# Patient Record
Sex: FEMALE | Race: WHITE | NOT HISPANIC OR LATINO | Employment: STUDENT | URBAN - METROPOLITAN AREA
[De-identification: names, ages, dates, MRNs, and addresses within clinical notes are randomized per-mention and may not be internally consistent; named-entity substitution may affect disease eponyms.]

---
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
## 2025-06-01 ENCOUNTER — OFFICE VISIT (OUTPATIENT)
Dept: URGENT CARE | Age: 20
End: 2025-06-01
Payer: COMMERCIAL

## 2025-06-01 VITALS
WEIGHT: 279 LBS | HEIGHT: 61 IN | TEMPERATURE: 98.4 F | DIASTOLIC BLOOD PRESSURE: 82 MMHG | HEART RATE: 88 BPM | OXYGEN SATURATION: 96 % | BODY MASS INDEX: 52.67 KG/M2 | RESPIRATION RATE: 18 BRPM | SYSTOLIC BLOOD PRESSURE: 143 MMHG

## 2025-06-01 DIAGNOSIS — H92.01 RIGHT EAR PAIN: ICD-10-CM

## 2025-06-01 DIAGNOSIS — H66.001 NON-RECURRENT ACUTE SUPPURATIVE OTITIS MEDIA OF RIGHT EAR WITHOUT SPONTANEOUS RUPTURE OF TYMPANIC MEMBRANE: Primary | ICD-10-CM

## 2025-06-01 PROCEDURE — 99213 OFFICE O/P EST LOW 20 MIN: CPT | Performed by: PHYSICIAN ASSISTANT

## 2025-06-01 PROCEDURE — 3008F BODY MASS INDEX DOCD: CPT | Performed by: PHYSICIAN ASSISTANT

## 2025-06-01 RX ORDER — AMOXICILLIN 400 MG/5ML
500 POWDER, FOR SUSPENSION ORAL EVERY 12 HOURS SCHEDULED
Qty: 126 ML | Refills: 0 | Status: SHIPPED | OUTPATIENT
Start: 2025-06-01 | End: 2025-06-11

## 2025-06-01 RX ORDER — AMOXICILLIN 400 MG/5ML
500 POWDER, FOR SUSPENSION ORAL EVERY 12 HOURS SCHEDULED
Qty: 126 ML | Refills: 0 | Status: SHIPPED | OUTPATIENT
Start: 2025-06-01 | End: 2025-06-01

## 2025-06-01 ASSESSMENT — ENCOUNTER SYMPTOMS
ABDOMINAL PAIN: 0
NUMBNESS: 0
DIZZINESS: 0
SHORTNESS OF BREATH: 0

## 2025-06-01 NOTE — PROGRESS NOTES
"Subjective   Patient ID: Bertha Donald is a 20 y.o. female. They present today with a chief complaint of Earache (2 days ). Hx of ear infections. Pain in R ear radiating down R face. No discharge. No f/c.    History of Present Illness  HPI    Past Medical History  Allergies as of 06/01/2025    (No Known Allergies)       Prescriptions Prior to Admission[1]     Medical History[2]    Surgical History[3]     reports that she does not drink alcohol and does not use drugs.    Review of Systems  Review of Systems   HENT:          R ear pain   Respiratory:  Negative for shortness of breath.    Cardiovascular:  Negative for chest pain.   Gastrointestinal:  Negative for abdominal pain.   Neurological:  Negative for dizziness and numbness.                                  Objective    Vitals:    06/01/25 1651   BP: 143/82   Pulse: 88   Resp: 18   Temp: 36.9 °C (98.4 °F)   TempSrc: Oral   SpO2: 96%   Weight: 127 kg (279 lb)   Height: 1.549 m (5' 1\")     No LMP recorded.    Physical Exam  Vitals and nursing note reviewed.   Constitutional:       Appearance: Normal appearance.   HENT:      Right Ear: No middle ear effusion. Tympanic membrane is injected, erythematous and bulging.      Left Ear:  No middle ear effusion. Tympanic membrane is not injected or erythematous.   Eyes:      Conjunctiva/sclera: Conjunctivae normal.   Cardiovascular:      Rate and Rhythm: Normal rate.   Pulmonary:      Effort: Pulmonary effort is normal.   Neurological:      Mental Status: She is alert.   Psychiatric:         Mood and Affect: Mood normal.         Procedures    Point of Care Test & Imaging Results from this visit  No results found for this visit on 06/01/25.   Imaging  No results found.    Cardiology, Vascular, and Other Imaging  No other imaging results found for the past 2 days      Diagnostic study results (if any) were reviewed by Gianni Willson PA-C.    Assessment/Plan   Allergies, medications, history, and pertinent labs/EKGs/Imaging " reviewed by Gianni Willson PA-C.     Medical Decision Making  Findings consistent with AOM without rupture. No evidence of mastoiditis, PTA, PNA on exam. Education on supportive measures and return precautions.   amox    Orders and Diagnoses  Diagnoses and all orders for this visit:  Non-recurrent acute suppurative otitis media of right ear without spontaneous rupture of tympanic membrane  -     amoxicillin (Amoxil) 400 mg/5 mL suspension; Take 6.3 mL (500 mg) by mouth every 12 hours for 10 days.  Right ear pain  -     amoxicillin (Amoxil) 400 mg/5 mL suspension; Take 6.3 mL (500 mg) by mouth every 12 hours for 10 days.      Medical Admin Record      Patient disposition: Home    Electronically signed by Gianni Willson PA-C  4:55 PM           [1] (Not in a hospital admission)   [2]   Past Medical History:  Diagnosis Date    Abdominal discomfort 03/07/2024    Obesity, unspecified 08/10/2016    Obesity peds (BMI >=95 percentile)    Personal history of diseases of the blood and blood-forming organs and certain disorders involving the immune mechanism 08/04/2022    History of cytopenia    Personal history of other diseases of the respiratory system 11/02/2015    History of acute pharyngitis    Unspecified injury of unspecified foot, initial encounter 09/22/2015    Foot injury   [3]   Past Surgical History:  Procedure Laterality Date    TYMPANOSTOMY TUBE PLACEMENT  11/25/2013    Ear Pressure Equalization Tube

## 2025-06-03 ENCOUNTER — TELEPHONE (OUTPATIENT)
Dept: PEDIATRICS | Facility: CLINIC | Age: 20
End: 2025-06-03

## 2025-06-03 ENCOUNTER — OFFICE VISIT (OUTPATIENT)
Dept: PEDIATRICS | Facility: CLINIC | Age: 20
End: 2025-06-03
Payer: COMMERCIAL

## 2025-06-03 VITALS
TEMPERATURE: 97.2 F | BODY MASS INDEX: 52.52 KG/M2 | SYSTOLIC BLOOD PRESSURE: 120 MMHG | WEIGHT: 278.2 LBS | DIASTOLIC BLOOD PRESSURE: 72 MMHG | HEIGHT: 61 IN

## 2025-06-03 DIAGNOSIS — H60.311 ACUTE DIFFUSE OTITIS EXTERNA OF RIGHT EAR: ICD-10-CM

## 2025-06-03 DIAGNOSIS — H66.003 ACUTE SUPPURATIVE OTITIS MEDIA OF BOTH EARS WITHOUT SPONTANEOUS RUPTURE OF TYMPANIC MEMBRANES, RECURRENCE NOT SPECIFIED: Primary | ICD-10-CM

## 2025-06-03 PROCEDURE — 99213 OFFICE O/P EST LOW 20 MIN: CPT | Performed by: PEDIATRICS

## 2025-06-03 PROCEDURE — 3008F BODY MASS INDEX DOCD: CPT | Performed by: PEDIATRICS

## 2025-06-03 RX ORDER — CIPROFLOXACIN AND DEXAMETHASONE 3; 1 MG/ML; MG/ML
4 SUSPENSION/ DROPS AURICULAR (OTIC) 2 TIMES DAILY
Qty: 7.5 ML | Refills: 0 | Status: SHIPPED | OUTPATIENT
Start: 2025-06-03

## 2025-06-03 RX ORDER — AMOXICILLIN AND CLAVULANATE POTASSIUM 600; 42.9 MG/5ML; MG/5ML
POWDER, FOR SUSPENSION ORAL
Qty: 150 ML | Refills: 0 | Status: SHIPPED | OUTPATIENT
Start: 2025-06-03

## 2025-06-03 ASSESSMENT — ENCOUNTER SYMPTOMS
ACTIVITY CHANGE: 1
SLEEP DISTURBANCE: 1

## 2025-06-03 NOTE — TELEPHONE ENCOUNTER
Mom calling, [phone]    Bertha was seen on Sunday for right ear pain at , she was prescribed Amox. Was told it was almost ruptured. Mom says she has taken 4 doses so far, Bertha is in so much pain, She is also taking tylenol and ibuprofen, & doing heat compresses , mom knows it can take a few days for antibiotic to work but wondering if she should be rechecked or what would be recommended at this time, she is not sleeping.

## 2025-06-03 NOTE — PROGRESS NOTES
Subjective   Patient ID: Bertha Donald is a 20 y.o. female who presents for Follow-up (Seen at [facility] on 6/1/2025 for right ear infection. Not feeling any better. ).  Bertha has been on Amoxil for right OM now her left ear hurts. Her dosage of Amoxil form UC in below the recommended dosage.         Review of Systems   Constitutional:  Positive for activity change.   HENT:  Positive for congestion and ear pain.    Psychiatric/Behavioral:  Positive for sleep disturbance.        Objective   Physical Exam  HENT:      Ears:      Comments: Both Tms are red with opaque fluid.   Right ear canal is red and tender     Nose: Congestion and rhinorrhea present.      Mouth/Throat:      Mouth: Mucous membranes are moist.   Eyes:      Conjunctiva/sclera: Conjunctivae normal.   Lymphadenopathy:      Cervical: Cervical adenopathy present.   Neurological:      General: No focal deficit present.         Assessment/Plan   Diagnoses and all orders for this visit:  Acute suppurative otitis media of both ears without spontaneous rupture of tympanic membranes, recurrence not specified  -     amoxicillin-clavulanate (Augmentin ES-600) 600-42.9 mg/5 mL suspension; 7.5ml PO BID for 10 days  Acute diffuse otitis externa of right ear  -     ciprofloxacin-dexamethasone (Ciprodex) otic suspension; Administer 4 drops into the right ear 2 times a day.           Katiuska Griffin MD 06/03/25 2:22 PM

## 2025-08-01 ENCOUNTER — TELEPHONE (OUTPATIENT)
Dept: PEDIATRICS | Facility: CLINIC | Age: 20
End: 2025-08-01
Payer: COMMERCIAL

## 2025-08-01 NOTE — TELEPHONE ENCOUNTER
Mom calling,     Bertha has had her period for 3 months, mom thinks she needs to start BCP , she is wondering if she can do that here or would you want her to see OBGYN?     -I already told her she should probably see OBGYN, mom wanted me to ask.

## 2025-08-07 ENCOUNTER — OFFICE VISIT (OUTPATIENT)
Dept: OBSTETRICS AND GYNECOLOGY | Facility: CLINIC | Age: 20
End: 2025-08-07
Payer: COMMERCIAL

## 2025-08-07 ENCOUNTER — HOSPITAL ENCOUNTER (OUTPATIENT)
Dept: RADIOLOGY | Facility: CLINIC | Age: 20
Discharge: HOME | End: 2025-08-07
Payer: COMMERCIAL

## 2025-08-07 VITALS
WEIGHT: 273 LBS | DIASTOLIC BLOOD PRESSURE: 74 MMHG | BODY MASS INDEX: 51.54 KG/M2 | SYSTOLIC BLOOD PRESSURE: 110 MMHG | HEIGHT: 61 IN

## 2025-08-07 DIAGNOSIS — N92.1 MENOMETRORRHAGIA: ICD-10-CM

## 2025-08-07 DIAGNOSIS — N92.6 IRREGULAR MENSTRUAL CYCLE: Primary | ICD-10-CM

## 2025-08-07 DIAGNOSIS — N92.6 IRREGULAR MENSTRUAL CYCLE: ICD-10-CM

## 2025-08-07 PROCEDURE — 76856 US EXAM PELVIC COMPLETE: CPT

## 2025-08-07 PROCEDURE — 1036F TOBACCO NON-USER: CPT

## 2025-08-07 PROCEDURE — 99203 OFFICE O/P NEW LOW 30 MIN: CPT

## 2025-08-07 PROCEDURE — 76856 US EXAM PELVIC COMPLETE: CPT | Performed by: RADIOLOGY

## 2025-08-07 PROCEDURE — 3008F BODY MASS INDEX DOCD: CPT

## 2025-08-07 RX ORDER — NORETHINDRONE ACETATE AND ETHINYL ESTRADIOL 1MG-20(21)
1 KIT ORAL DAILY
Qty: 84 TABLET | Refills: 3 | Status: SHIPPED | OUTPATIENT
Start: 2025-08-07 | End: 2026-08-07

## 2025-08-07 ASSESSMENT — PATIENT HEALTH QUESTIONNAIRE - PHQ9
2. FEELING DOWN, DEPRESSED OR HOPELESS: NOT AT ALL
1. LITTLE INTEREST OR PLEASURE IN DOING THINGS: NOT AT ALL
SUM OF ALL RESPONSES TO PHQ9 QUESTIONS 1 & 2: 0

## 2025-08-07 ASSESSMENT — ENCOUNTER SYMPTOMS
FEVER: 0
NAUSEA: 0
ABDOMINAL PAIN: 0
DYSURIA: 0
UNEXPECTED WEIGHT CHANGE: 0
SHORTNESS OF BREATH: 0
VOMITING: 0
FATIGUE: 0

## 2025-08-07 ASSESSMENT — LIFESTYLE VARIABLES
SKIP TO QUESTIONS 9-10: 1
HOW OFTEN DO YOU HAVE A DRINK CONTAINING ALCOHOL: NEVER
HOW OFTEN DO YOU HAVE SIX OR MORE DRINKS ON ONE OCCASION: NEVER
AUDIT-C TOTAL SCORE: 0
HOW MANY STANDARD DRINKS CONTAINING ALCOHOL DO YOU HAVE ON A TYPICAL DAY: PATIENT DOES NOT DRINK

## 2025-08-07 ASSESSMENT — PAIN SCALES - GENERAL: PAINLEVEL_OUTOF10: 0-NO PAIN

## 2025-08-07 NOTE — PROGRESS NOTES
Subjective   Bertha Donald is a 20 y.o. female who is here with her mom as a new patient establishing care to discuss her menstrual cycles.  - Menarche around 15 years old.  - Menstrual cycles have been irregular since menarche.   - States her cycles significantly vary; the longest she has gone in duration without bleeding is 3 months; on average, she usually skips 2 months at a time; she usually experiences 3 days of bleeding when she does get her menstrual cycle.  - She has had more irregular bleeding as a recent; started bleeding on 2025 and bleeding has continued, still bleeding today; states it initially started off light with spotting; eventually picked up to a heavier flow; on heavy days, she will wear an overnight pad and can saturate it within 4 hours; sometimes passing golf ball size clots.   - Mom and patient do mention that she has a history of being anemic with her periods in high school.  No recent blood work on file.  - Denies any acne or hirsutism; denies any hair loss or hair thinning.  - She is fairly sedentary; admits that she does not currently have any exercise regimen she follows.  No history of intensive training or high competitive sport involvement in high school.  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   - Mom and patient would like patient to begin on OCP to help regulate her bleeding pattern.   - Denies any history of known clotting disorders.  Denies any history of VTE.  Denies history of hypertension, liver disease, or smoking cigarettes; denies migraines with auras.      OB History          0    Para   0    Term   0       0    AB   0    Living   0         SAB   0    IAB   0    Ectopic   0    Multiple   0    Live Births   0                  Review of Systems   Constitutional:  Negative for fatigue, fever and unexpected weight change.   Respiratory:  Negative for shortness of breath.    Gastrointestinal:  Negative for abdominal  "pain, nausea and vomiting.   Genitourinary:  Positive for menstrual problem and vaginal bleeding. Negative for dysuria, pelvic pain and vaginal discharge.       Objective   /74   Ht 1.556 m (5' 1.25\")   Wt 124 kg (273 lb)   LMP 06/07/2025   BMI 51.16 kg/m²        General:   Alert and oriented, in no acute distress   Psych Normal affect. Normal mood.      Assessment/Plan   Diagnoses and all orders for this visit:  Irregular menstrual cycle  -     TSH with reflex to Free T4 if abnormal; Future  -     CBC; Future  -     Testosterone,Free and Total; Future  -     Prolactin; Future  -     US PELVIS TRANSABDOMINAL WITH TRANSVAGINAL; Future  -     FSH & LH; Future  -     Hemoglobin A1C; Future  -     norethindrone-e.estradioL-iron (Junel FE 1/20) 1 mg-20 mcg (21)/75 mg (7) tablet; Take 1 tablet by mouth once daily.  - Will rule out any hormonal abnormalities with blood work, and obtain pelvic US with her recent prolonged bleeding to better assess endometrium thickness/uterine cavity, as well as possible PCOS ovarian appearance.  Plan to follow-up with mom and patient once these results come in.  - Will also check for any anemia with recent prolonged bleeding episode.  - In the meantime, she would like to begin on OCPs to help regulate her bleeding pattern.  OCP chosen to provide some iron supplementation with her history of anemia in the past. No current contraindications to OCP use.  We did review that this OCP is not necessarily reestablishing regular menstrual cycles but rather inducing a medication withdrawal bleed.  She has never been sexually active.  She will get her blood work from today before OCP initiation.  Reviewed beginning pill pack on Sunday.  Reviewed how to take pill daily at the same time each day / memory cues.   - Plan to return in 3 months for med check follow-up visit.  In the interim, we will wait for workup/evaluation findings and be in touch with them once they finalize. "   Menometrorrhagia  -     TSH with reflex to Free T4 if abnormal; Future  -     CBC; Future  -     Testosterone,Free and Total; Future  -     Prolactin; Future  -     US PELVIS TRANSABDOMINAL WITH TRANSVAGINAL; Future  -     FSH & LH; Future  -     norethindrone-e.estradioL-iron (Junel FE 1/20) 1 mg-20 mcg (21)/75 mg (7) tablet; Take 1 tablet by mouth once daily.    Marilu Long PA-C

## 2025-08-08 LAB
ERYTHROCYTE [DISTWIDTH] IN BLOOD BY AUTOMATED COUNT: 14.3 % (ref 11–15)
EST. AVERAGE GLUCOSE BLD GHB EST-MCNC: 105 MG/DL
EST. AVERAGE GLUCOSE BLD GHB EST-SCNC: 5.8 MMOL/L
FSH SERPL-ACNC: 4.9 MIU/ML
HBA1C MFR BLD: 5.3 %
HCT VFR BLD AUTO: 35.9 % (ref 35–45)
HGB BLD-MCNC: 11.5 G/DL (ref 11.7–15.5)
LH SERPL-ACNC: 9.4 MIU/ML
MCH RBC QN AUTO: 26.9 PG (ref 27–33)
MCHC RBC AUTO-ENTMCNC: 32 G/DL (ref 32–36)
MCV RBC AUTO: 83.9 FL (ref 80–100)
PLATELET # BLD AUTO: 395 THOUSAND/UL (ref 140–400)
PMV BLD REES-ECKER: 9.3 FL (ref 7.5–12.5)
PROLACTIN SERPL-MCNC: 7.4 NG/ML
RBC # BLD AUTO: 4.28 MILLION/UL (ref 3.8–5.1)
TESTOST FREE SERPL-MCNC: NORMAL PG/ML
TESTOST SERPL-MCNC: NORMAL NG/DL
TSH SERPL-ACNC: 1.52 MIU/L
WBC # BLD AUTO: 13.9 THOUSAND/UL (ref 3.8–10.8)

## 2025-08-13 LAB
ERYTHROCYTE [DISTWIDTH] IN BLOOD BY AUTOMATED COUNT: 14.3 % (ref 11–15)
EST. AVERAGE GLUCOSE BLD GHB EST-MCNC: 105 MG/DL
EST. AVERAGE GLUCOSE BLD GHB EST-SCNC: 5.8 MMOL/L
FSH SERPL-ACNC: 4.9 MIU/ML
HBA1C MFR BLD: 5.3 %
HCT VFR BLD AUTO: 35.9 % (ref 35–45)
HGB BLD-MCNC: 11.5 G/DL (ref 11.7–15.5)
LH SERPL-ACNC: 9.4 MIU/ML
MCH RBC QN AUTO: 26.9 PG (ref 27–33)
MCHC RBC AUTO-ENTMCNC: 32 G/DL (ref 32–36)
MCV RBC AUTO: 83.9 FL (ref 80–100)
PLATELET # BLD AUTO: 395 THOUSAND/UL (ref 140–400)
PMV BLD REES-ECKER: 9.3 FL (ref 7.5–12.5)
PROLACTIN SERPL-MCNC: 7.4 NG/ML
RBC # BLD AUTO: 4.28 MILLION/UL (ref 3.8–5.1)
TESTOST FREE SERPL-MCNC: 5.1 PG/ML (ref 0.1–6.4)
TESTOST SERPL-MCNC: 26 NG/DL (ref 2–45)
TSH SERPL-ACNC: 1.52 MIU/L
WBC # BLD AUTO: 13.9 THOUSAND/UL (ref 3.8–10.8)

## 2025-08-18 ENCOUNTER — TELEMEDICINE (OUTPATIENT)
Dept: OBSTETRICS AND GYNECOLOGY | Facility: CLINIC | Age: 20
End: 2025-08-18
Payer: COMMERCIAL

## 2025-08-18 DIAGNOSIS — N92.1 MENOMETRORRHAGIA: ICD-10-CM

## 2025-08-18 DIAGNOSIS — N92.6 IRREGULAR MENSTRUAL CYCLE: Primary | ICD-10-CM

## 2025-08-18 DIAGNOSIS — D72.828 OTHER ELEVATED WHITE BLOOD CELL COUNT: ICD-10-CM

## 2025-08-18 PROCEDURE — 99213 OFFICE O/P EST LOW 20 MIN: CPT

## 2025-08-18 PROCEDURE — 1036F TOBACCO NON-USER: CPT

## 2025-08-18 ASSESSMENT — ENCOUNTER SYMPTOMS
DYSURIA: 0
NAUSEA: 0
UNEXPECTED WEIGHT CHANGE: 0
FEVER: 0
FATIGUE: 0
VOMITING: 0
ABDOMINAL PAIN: 0
SHORTNESS OF BREATH: 0